# Patient Record
Sex: FEMALE | Race: WHITE | Employment: FULL TIME | URBAN - METROPOLITAN AREA
[De-identification: names, ages, dates, MRNs, and addresses within clinical notes are randomized per-mention and may not be internally consistent; named-entity substitution may affect disease eponyms.]

---

## 2024-01-10 ENCOUNTER — APPOINTMENT (OUTPATIENT)
Dept: CT IMAGING | Age: 24
End: 2024-01-10
Payer: MEDICAID

## 2024-01-10 ENCOUNTER — HOSPITAL ENCOUNTER (EMERGENCY)
Age: 24
Discharge: HOME | End: 2024-01-10
Attending: EMERGENCY MEDICINE
Payer: MEDICAID

## 2024-01-10 VITALS
SYSTOLIC BLOOD PRESSURE: 104 MMHG | DIASTOLIC BLOOD PRESSURE: 70 MMHG | RESPIRATION RATE: 16 BRPM | WEIGHT: 170 LBS | HEART RATE: 93 BPM | BODY MASS INDEX: 32.12 KG/M2 | TEMPERATURE: 98.7 F | OXYGEN SATURATION: 96 %

## 2024-01-10 DIAGNOSIS — S06.0XAS CONCUSSION WITH UNKNOWN LOSS OF CONSCIOUSNESS STATUS, SEQUELA (HCC): Primary | ICD-10-CM

## 2024-01-10 LAB
EKG ATRIAL RATE: 80 BPM
EKG DIAGNOSIS: NORMAL
EKG P AXIS: 24 DEGREES
EKG P-R INTERVAL: 138 MS
EKG Q-T INTERVAL: 380 MS
EKG QRS DURATION: 78 MS
EKG QTC CALCULATION (BAZETT): 438 MS
EKG R AXIS: 32 DEGREES
EKG T AXIS: 15 DEGREES
EKG VENTRICULAR RATE: 80 BPM
GLUCOSE BLD-MCNC: 87 MG/DL (ref 70–99)
HCG UR QL: NEGATIVE
PERFORMED ON: NORMAL

## 2024-01-10 PROCEDURE — 99284 EMERGENCY DEPT VISIT MOD MDM: CPT

## 2024-01-10 PROCEDURE — 6370000000 HC RX 637 (ALT 250 FOR IP): Performed by: INTERNAL MEDICINE

## 2024-01-10 PROCEDURE — 70450 CT HEAD/BRAIN W/O DYE: CPT

## 2024-01-10 PROCEDURE — 93005 ELECTROCARDIOGRAM TRACING: CPT | Performed by: EMERGENCY MEDICINE

## 2024-01-10 PROCEDURE — 70480 CT ORBIT/EAR/FOSSA W/O DYE: CPT

## 2024-01-10 PROCEDURE — 84703 CHORIONIC GONADOTROPIN ASSAY: CPT

## 2024-01-10 RX ORDER — ONDANSETRON 4 MG/1
4 TABLET, ORALLY DISINTEGRATING ORAL 3 TIMES DAILY PRN
Qty: 21 TABLET | Refills: 0 | Status: SHIPPED | OUTPATIENT
Start: 2024-01-10

## 2024-01-10 RX ORDER — ACETAMINOPHEN 325 MG/1
650 TABLET ORAL ONCE
Status: COMPLETED | OUTPATIENT
Start: 2024-01-10 | End: 2024-01-10

## 2024-01-10 RX ORDER — ONDANSETRON 4 MG/1
4 TABLET, ORALLY DISINTEGRATING ORAL ONCE
Status: COMPLETED | OUTPATIENT
Start: 2024-01-10 | End: 2024-01-10

## 2024-01-10 RX ADMIN — ACETAMINOPHEN 650 MG: 325 TABLET ORAL at 19:38

## 2024-01-10 RX ADMIN — ONDANSETRON 4 MG: 4 TABLET, ORALLY DISINTEGRATING ORAL at 19:38

## 2024-01-10 ASSESSMENT — ENCOUNTER SYMPTOMS
DIARRHEA: 0
CHOKING: 0
SORE THROAT: 0
SINUS PRESSURE: 0
NAUSEA: 0
ABDOMINAL PAIN: 0
EYE ITCHING: 0
SHORTNESS OF BREATH: 0
CHEST TIGHTNESS: 0
COLOR CHANGE: 0
BACK PAIN: 0
EYE DISCHARGE: 0
CONSTIPATION: 0
RHINORRHEA: 0

## 2024-01-10 ASSESSMENT — PAIN DESCRIPTION - PAIN TYPE: TYPE: ACUTE PAIN

## 2024-01-10 ASSESSMENT — PAIN DESCRIPTION - DESCRIPTORS: DESCRIPTORS: ACHING

## 2024-01-10 ASSESSMENT — PAIN - FUNCTIONAL ASSESSMENT
PAIN_FUNCTIONAL_ASSESSMENT: PREVENTS OR INTERFERES SOME ACTIVE ACTIVITIES AND ADLS
PAIN_FUNCTIONAL_ASSESSMENT: 0-10

## 2024-01-10 ASSESSMENT — PAIN SCALES - GENERAL: PAINLEVEL_OUTOF10: 3

## 2024-01-10 ASSESSMENT — PAIN DESCRIPTION - LOCATION: LOCATION: FACE

## 2024-01-10 ASSESSMENT — PAIN DESCRIPTION - ORIENTATION: ORIENTATION: RIGHT

## 2024-01-10 NOTE — ED TRIAGE NOTES
[facility] Emergency Department  MEDICAL SCREENING EXAM    Date of Service: 1/10/2024    Reason for Visit: Loss of Consciousness (Pt st tevin she donated blood one day ago and after she was finished donating she was sitting in a chair drinking juice when she had LOC. Pt states she fell forward hitting her face on a table, she has some soreness to rt side face. Pt also reports she had emesis x2 following episode. Pt c/o headache today and feeling tired.  )        Patient History, Brief Exam, and Initial Assessment     Abbreviated HPI: Lashay Alvarez is a 23 y.o. female presenting with syncope while donating blood yesterday, resulting in fall forward with face and head injury.  +LOC and vomiting x2.  +HA.  Tender below right eye.    INITIAL VITALS: BP: 104/70, Temp: 98.7 °F (37.1 °C), Pulse: 93, Respirations: 16, SpO2: 96 %    Plan     Patient was evaluated in the REU for a medical screening exam.  To further evaluate the presenting complaints, the following orders have been placed:  Orders Placed This Encounter   Procedures    CT HEAD WO CONTRAST    CT ORBITS WO CONTRAST    Pregnancy, urine    AccuCheck    POCT Glucose    EKG 12 Lead (Syncope)        See primary provider's note for full details and final disposition.     Current Facility-Administered Medications:   No orders of the defined types were placed in this encounter.        REU Dispo     Stable for lobby while awaiting ED bed        Relevant Medical History     No past medical history on file.  No past surgical history on file.  No Known Allergies

## 2024-01-11 NOTE — DISCHARGE INSTRUCTIONS
- You were seen in the emergency department due to passing out after giving blood.  Your workup was unremarkable.  CT head is reassuring.  I do suspect that you have a concussion, handout was provided.  -You are given a prescription for Zofran for nausea.  Also okay to take Tylenol for headaches.  -Follow-up with your PCP.  -Return to the ER with concerns symptoms such as fever, chills, chest pain, shortness of breath, additional syncopal episodes, vision changes or other concerning symptoms

## 2024-01-11 NOTE — ED PROVIDER NOTES
ED Attending Attestation Note     Date of evaluation: 1/10/2024    This patient was seen by the advance practice provider.  I have seen and examined the patient, agree with the workup, evaluation, management and diagnosis. The care plan has been discussed.  I have reviewed the ECG and concur with the REBECCA's interpretation.  My assessment reveals patient here with syncopal episode resulting in head injury while giving blood yesterday.  Workup here is unremarkable and patient was felt to be stable for discharge home with negative imaging, negative pregnancy, normal glucose.     Ramos Burgos MD  01/10/24 1932    
distension.      Palpations: Abdomen is soft.      Tenderness: There is no abdominal tenderness. There is no right CVA tenderness, left CVA tenderness or guarding.   Musculoskeletal:         General: Normal range of motion.      Cervical back: Normal range of motion and neck supple.   Skin:     General: Skin is warm.      Capillary Refill: Capillary refill takes less than 2 seconds.   Neurological:      General: No focal deficit present.      Mental Status: She is alert and oriented to person, place, and time.   Psychiatric:         Mood and Affect: Mood normal.         Behavior: Behavior normal.            Darryl Victor PA-C  01/10/24 1938

## 2024-10-25 ENCOUNTER — APPOINTMENT (OUTPATIENT)
Dept: CT IMAGING | Age: 24
End: 2024-10-25
Payer: COMMERCIAL

## 2024-10-25 ENCOUNTER — HOSPITAL ENCOUNTER (EMERGENCY)
Age: 24
Discharge: HOME | End: 2024-10-26
Attending: STUDENT IN AN ORGANIZED HEALTH CARE EDUCATION/TRAINING PROGRAM
Payer: COMMERCIAL

## 2024-10-25 DIAGNOSIS — N30.00 ACUTE CYSTITIS WITHOUT HEMATURIA: ICD-10-CM

## 2024-10-25 DIAGNOSIS — R10.31 RIGHT LOWER QUADRANT ABDOMINAL PAIN: Primary | ICD-10-CM

## 2024-10-25 LAB
ALBUMIN SERPL-MCNC: 4.6 G/DL (ref 3.4–5)
ALP SERPL-CCNC: 89 U/L (ref 40–129)
ALT SERPL-CCNC: 16 U/L (ref 10–40)
AMORPH SED URNS QL MICRO: ABNORMAL /HPF
ANION GAP SERPL CALCULATED.3IONS-SCNC: 11 MMOL/L (ref 3–16)
APTT BLD: 30.2 SEC (ref 22.1–36.4)
AST SERPL-CCNC: 15 U/L (ref 15–37)
BACTERIA URNS QL MICRO: ABNORMAL /HPF
BASOPHILS # BLD: 0 K/UL (ref 0–0.2)
BASOPHILS NFR BLD: 0.4 %
BILIRUB DIRECT SERPL-MCNC: 0.2 MG/DL (ref 0–0.3)
BILIRUB INDIRECT SERPL-MCNC: 0.3 MG/DL (ref 0–1)
BILIRUB SERPL-MCNC: 0.5 MG/DL (ref 0–1)
BILIRUB UR QL STRIP.AUTO: NEGATIVE
BUN SERPL-MCNC: 15 MG/DL (ref 7–20)
CALCIUM SERPL-MCNC: 9.5 MG/DL (ref 8.3–10.6)
CHLORIDE SERPL-SCNC: 104 MMOL/L (ref 99–110)
CLARITY UR: CLEAR
CO2 SERPL-SCNC: 23 MMOL/L (ref 21–32)
COLOR UR: YELLOW
CREAT SERPL-MCNC: 0.8 MG/DL (ref 0.6–1.1)
DEPRECATED RDW RBC AUTO: 12.7 % (ref 12.4–15.4)
EOSINOPHIL # BLD: 0.1 K/UL (ref 0–0.6)
EOSINOPHIL NFR BLD: 1.3 %
EPI CELLS #/AREA URNS HPF: ABNORMAL /HPF (ref 0–5)
GFR SERPLBLD CREATININE-BSD FMLA CKD-EPI: >90 ML/MIN/{1.73_M2}
GLUCOSE SERPL-MCNC: 97 MG/DL (ref 70–99)
GLUCOSE UR STRIP.AUTO-MCNC: NEGATIVE MG/DL
HCG SERPL QL: NEGATIVE
HCT VFR BLD AUTO: 42.3 % (ref 36–48)
HGB BLD-MCNC: 14.9 G/DL (ref 12–16)
HGB UR QL STRIP.AUTO: NEGATIVE
INR PPP: 1.01 (ref 0.85–1.15)
KETONES UR STRIP.AUTO-MCNC: ABNORMAL MG/DL
LEUKOCYTE ESTERASE UR QL STRIP.AUTO: ABNORMAL
LIPASE SERPL-CCNC: 24 U/L (ref 13–60)
LYMPHOCYTES # BLD: 1.2 K/UL (ref 1–5.1)
LYMPHOCYTES NFR BLD: 18.1 %
MCH RBC QN AUTO: 31.4 PG (ref 26–34)
MCHC RBC AUTO-ENTMCNC: 35.1 G/DL (ref 31–36)
MCV RBC AUTO: 89.4 FL (ref 80–100)
MONOCYTES # BLD: 0.5 K/UL (ref 0–1.3)
MONOCYTES NFR BLD: 7.5 %
MUCOUS THREADS #/AREA URNS LPF: ABNORMAL /LPF
NEUTROPHILS # BLD: 4.8 K/UL (ref 1.7–7.7)
NEUTROPHILS NFR BLD: 72.7 %
NITRITE UR QL STRIP.AUTO: POSITIVE
PH UR STRIP.AUTO: 6 [PH] (ref 5–8)
PLATELET # BLD AUTO: 325 K/UL (ref 135–450)
PMV BLD AUTO: 6.5 FL (ref 5–10.5)
POTASSIUM SERPL-SCNC: 4.4 MMOL/L (ref 3.5–5.1)
PROT SERPL-MCNC: 7.3 G/DL (ref 6.4–8.2)
PROT UR STRIP.AUTO-MCNC: ABNORMAL MG/DL
PROTHROMBIN TIME: 13.5 SEC (ref 11.9–14.9)
RBC # BLD AUTO: 4.73 M/UL (ref 4–5.2)
RBC #/AREA URNS HPF: ABNORMAL /HPF (ref 0–4)
RENAL EPI CELLS #/AREA UR COMP ASSIST: ABNORMAL /HPF (ref 0–1)
SODIUM SERPL-SCNC: 138 MMOL/L (ref 136–145)
SP GR UR STRIP.AUTO: >=1.03 (ref 1–1.03)
UA COMPLETE W REFLEX CULTURE PNL UR: YES
UA DIPSTICK W REFLEX MICRO PNL UR: YES
URN SPEC COLLECT METH UR: ABNORMAL
UROBILINOGEN UR STRIP-ACNC: 0.2 E.U./DL
WBC # BLD AUTO: 6.6 K/UL (ref 4–11)
WBC #/AREA URNS HPF: ABNORMAL /HPF (ref 0–5)

## 2024-10-25 PROCEDURE — 87086 URINE CULTURE/COLONY COUNT: CPT

## 2024-10-25 PROCEDURE — 83690 ASSAY OF LIPASE: CPT

## 2024-10-25 PROCEDURE — 81001 URINALYSIS AUTO W/SCOPE: CPT

## 2024-10-25 PROCEDURE — 6360000004 HC RX CONTRAST MEDICATION

## 2024-10-25 PROCEDURE — 87088 URINE BACTERIA CULTURE: CPT

## 2024-10-25 PROCEDURE — 80048 BASIC METABOLIC PNL TOTAL CA: CPT

## 2024-10-25 PROCEDURE — 74177 CT ABD & PELVIS W/CONTRAST: CPT

## 2024-10-25 PROCEDURE — 87186 SC STD MICRODIL/AGAR DIL: CPT

## 2024-10-25 PROCEDURE — 84703 CHORIONIC GONADOTROPIN ASSAY: CPT

## 2024-10-25 PROCEDURE — 85610 PROTHROMBIN TIME: CPT

## 2024-10-25 PROCEDURE — 85730 THROMBOPLASTIN TIME PARTIAL: CPT

## 2024-10-25 PROCEDURE — 80076 HEPATIC FUNCTION PANEL: CPT

## 2024-10-25 PROCEDURE — 99285 EMERGENCY DEPT VISIT HI MDM: CPT

## 2024-10-25 PROCEDURE — 85025 COMPLETE CBC W/AUTO DIFF WBC: CPT

## 2024-10-25 RX ORDER — IOPAMIDOL 755 MG/ML
75 INJECTION, SOLUTION INTRAVASCULAR
Status: COMPLETED | OUTPATIENT
Start: 2024-10-25 | End: 2024-10-25

## 2024-10-25 RX ADMIN — IOPAMIDOL 75 ML: 755 INJECTION, SOLUTION INTRAVENOUS at 23:12

## 2024-10-25 ASSESSMENT — PAIN SCALES - GENERAL: PAINLEVEL_OUTOF10: 8

## 2024-10-25 ASSESSMENT — PAIN DESCRIPTION - ORIENTATION: ORIENTATION: RIGHT

## 2024-10-25 ASSESSMENT — LIFESTYLE VARIABLES
HOW MANY STANDARD DRINKS CONTAINING ALCOHOL DO YOU HAVE ON A TYPICAL DAY: 7 TO 9
HOW OFTEN DO YOU HAVE A DRINK CONTAINING ALCOHOL: MONTHLY OR LESS

## 2024-10-25 ASSESSMENT — PAIN DESCRIPTION - LOCATION: LOCATION: ABDOMEN

## 2024-10-25 ASSESSMENT — PAIN - FUNCTIONAL ASSESSMENT: PAIN_FUNCTIONAL_ASSESSMENT: 0-10

## 2024-10-26 VITALS
HEART RATE: 79 BPM | TEMPERATURE: 98.3 F | DIASTOLIC BLOOD PRESSURE: 79 MMHG | BODY MASS INDEX: 35.68 KG/M2 | SYSTOLIC BLOOD PRESSURE: 119 MMHG | HEIGHT: 61 IN | OXYGEN SATURATION: 99 % | RESPIRATION RATE: 16 BRPM | WEIGHT: 189 LBS

## 2024-10-26 RX ORDER — CEPHALEXIN 500 MG/1
500 CAPSULE ORAL 4 TIMES DAILY
Qty: 28 CAPSULE | Refills: 0 | Status: SHIPPED | OUTPATIENT
Start: 2024-10-26 | End: 2024-10-26

## 2024-10-26 RX ORDER — CEPHALEXIN 500 MG/1
500 CAPSULE ORAL 4 TIMES DAILY
Qty: 28 CAPSULE | Refills: 0 | Status: SHIPPED | OUTPATIENT
Start: 2024-10-26 | End: 2024-11-02

## 2024-10-26 NOTE — ED PROVIDER NOTES
THE [facility]  EMERGENCY DEPARTMENT ENCOUNTER          EM RESIDENT NOTE       Date of evaluation: 10/25/2024    Chief Complaint     Abdominal Pain (Patient presents to ED with report of right sided ovarian pain. Reports pain started at approximately 1930 tonight, states she took 1200 mg ibuprofen at 2000 without relief. Reports a history of ovarian cysts.) and Rectal Bleeding (Patient reports rectal bleeding intermittently for 5 days.)      History of Present Illness     Lashay Macedo is a 24 y.o. female who presents with multiple complaints including abdominal pain and rectal bleeding.  She notes 2-day history of right lower quadrant pain, constant in nature without referral.  Also endorses several episodes of bright red blood per rectum with passage of some clots.  She has a history of taking 1200 to 2000 mg of ibuprofen at a time over the last week for her right sided lower abdominal pain.  She denies urinary symptoms, changes in her vaginal discharge, or concerns for STI.  Does have a history of ovarian cysts for which she has never had surgery.  No abdominal surgery history.  Last bowel movement yesterday at 12 PM.  Is passing gas    MEDICAL DECISION MAKING / ASSESSMENT / PLAN     INITIAL VITALS: BP: 121/89, Temp: 98.3 °F (36.8 °C), Pulse: 86, Respirations: 18, SpO2: 99 %    Lashay Macedo is a 24 y.o. female presenting with right lower quadrant pain and bright red blood per rectum.  Exam notable for globally well-appearing female with normal vital signs including no fever or tachycardia/hypotension.  Her abdominal exam is notable for significant right lower quadrant tenderness with pain in the right lower quadrant upon palpation of any other area the abdomen, RULA negative for any blood.  Given her level of abdominal tenderness and voluntary guarding, I elected to perform a CT of the abdomen and pelvis on this patient.  The CT did not show any acute findings including no ovarian cysts, no appendicitis,

## 2024-10-26 NOTE — ED PROVIDER NOTES
ED Attending Attestation Note     Date of evaluation: 10/25/2024    I have discussed the case with the resident. I have personally performed a history, physical exam, and my own medical decision making. I have reviewed the note and agree with the findings and plan.     INITIAL VITALS: BP: 121/89, Temp: 98.3 °F (36.8 °C), Pulse: 86, Respirations: 18, SpO2: 99 %     MDM:  My assessment reveals a well-appearing 24-year-old female presenting with abdominal pain.  On examination, her abdomen is benign.  Her workup is notable for UTI.  CT imaging was obtained and does not show any evidence of ovarian pathology.  Patient tolerating p.o. and will be discharged with a course of antibiotics.  Discharged with strict return precautions and PCP follow-up       Albaro Cordoba MD  10/26/24 0141

## 2024-10-26 NOTE — DISCHARGE INSTRUCTIONS
You were seen here in the emergency department for abdominal pain.  We performed labs and imaging that did not show any dangerous cause of abdominal pain at this time.  Your urinalysis did show a urinary tract infection which is likely the cause of your abdominal pain.  We are sending you with a course of an antibiotic called Keflex, please take the whole course and return or see your PCP if your symptoms do not improve.    Please stop taking ibuprofen, no or anything else that is in the class of NSAID.  If you have any additional rectal bleeding, dark/black stool, any lightheadedness/passing out or shortness of breath please return to the emergency department.     You should return to the emergency department if your symptoms worsen or do not resolve. In addition, return if:  - You have a fever (greater than 101 degrees)  - You have chest pain, shortness of breath, abdominal pain, or uncontrollable vomiting  - You are unable to eat or drink  - You pass out  - You have difficulty moving your arms or legs   - You have difficulty speaking or slurred speech  - Or you have any concern that you feel needs acute physician evaluation.

## 2024-10-26 NOTE — ED NOTES
Patient prepared for and ready to be discharged. Patient left at this time to home in care of self in no acute distress after verbalizing understanding of discharge instructions. Patient left after receiving After Visit Summary instructions. IV removed prior to discharge.       Nuvia Schaefer RN  10/26/24 0140

## 2024-10-27 LAB
BACTERIA UR CULT: ABNORMAL
ORGANISM: ABNORMAL

## 2024-10-28 LAB
BACTERIA UR CULT: ABNORMAL
ORGANISM: ABNORMAL

## 2024-11-03 ENCOUNTER — APPOINTMENT (OUTPATIENT)
Dept: ULTRASOUND IMAGING | Age: 24
End: 2024-11-03

## 2024-11-03 ENCOUNTER — HOSPITAL ENCOUNTER (EMERGENCY)
Age: 24
Discharge: HOME | End: 2024-11-03

## 2024-11-03 VITALS
BODY MASS INDEX: 35.45 KG/M2 | RESPIRATION RATE: 16 BRPM | HEART RATE: 68 BPM | WEIGHT: 187.6 LBS | OXYGEN SATURATION: 99 % | DIASTOLIC BLOOD PRESSURE: 96 MMHG | SYSTOLIC BLOOD PRESSURE: 136 MMHG | TEMPERATURE: 98.9 F

## 2024-11-03 DIAGNOSIS — R10.9 ABDOMINAL PAIN, UNSPECIFIED ABDOMINAL LOCATION: Primary | ICD-10-CM

## 2024-11-03 DIAGNOSIS — N30.01 ACUTE CYSTITIS WITH HEMATURIA: ICD-10-CM

## 2024-11-03 DIAGNOSIS — R10.2 PELVIC PAIN: ICD-10-CM

## 2024-11-03 DIAGNOSIS — N93.9 ABNORMAL UTERINE BLEEDING: ICD-10-CM

## 2024-11-03 LAB
ALBUMIN SERPL-MCNC: 4.7 G/DL (ref 3.4–5)
ALBUMIN/GLOB SERPL: 1.5 {RATIO} (ref 1.1–2.2)
ALP SERPL-CCNC: 92 U/L (ref 40–129)
ALT SERPL-CCNC: 19 U/L (ref 10–40)
ANION GAP SERPL CALCULATED.3IONS-SCNC: 14 MMOL/L (ref 3–16)
AST SERPL-CCNC: 17 U/L (ref 15–37)
B-HCG SERPL EIA 3RD IS-ACNC: <5 MIU/ML
BACTERIA GENITAL QL WET PREP: NORMAL
BACTERIA URNS QL MICRO: ABNORMAL /HPF
BASOPHILS # BLD: 0 K/UL (ref 0–0.2)
BASOPHILS NFR BLD: 0.3 %
BILIRUB SERPL-MCNC: 0.6 MG/DL (ref 0–1)
BILIRUB UR QL STRIP.AUTO: NEGATIVE
BUN SERPL-MCNC: 16 MG/DL (ref 7–20)
CALCIUM SERPL-MCNC: 9.8 MG/DL (ref 8.3–10.6)
CHLORIDE SERPL-SCNC: 102 MMOL/L (ref 99–110)
CLARITY UR: CLEAR
CLUE CELLS SPEC QL WET PREP: NORMAL
CO2 SERPL-SCNC: 20 MMOL/L (ref 21–32)
COLOR UR: YELLOW
CREAT SERPL-MCNC: 1 MG/DL (ref 0.6–1.1)
CRYSTALS URNS MICRO: ABNORMAL /HPF
DEPRECATED RDW RBC AUTO: 12.9 % (ref 12.4–15.4)
EOSINOPHIL # BLD: 0 K/UL (ref 0–0.6)
EOSINOPHIL NFR BLD: 0.4 %
EPI CELLS #/AREA URNS HPF: ABNORMAL /HPF (ref 0–5)
EPI CELLS SPEC QL WET PREP: NORMAL
GFR SERPLBLD CREATININE-BSD FMLA CKD-EPI: 81 ML/MIN/{1.73_M2}
GLUCOSE SERPL-MCNC: 125 MG/DL (ref 70–99)
GLUCOSE UR STRIP.AUTO-MCNC: NEGATIVE MG/DL
HCG UR QL: NEGATIVE
HCT VFR BLD AUTO: 44.2 % (ref 36–48)
HGB BLD-MCNC: 15.3 G/DL (ref 12–16)
HGB UR QL STRIP.AUTO: ABNORMAL
KETONES UR STRIP.AUTO-MCNC: NEGATIVE MG/DL
LEUKOCYTE ESTERASE UR QL STRIP.AUTO: NEGATIVE
LIPASE SERPL-CCNC: 30 U/L (ref 13–60)
LYMPHOCYTES # BLD: 0.9 K/UL (ref 1–5.1)
LYMPHOCYTES NFR BLD: 8 %
MCH RBC QN AUTO: 30.9 PG (ref 26–34)
MCHC RBC AUTO-ENTMCNC: 34.6 G/DL (ref 31–36)
MCV RBC AUTO: 89.3 FL (ref 80–100)
MONOCYTES # BLD: 0.5 K/UL (ref 0–1.3)
MONOCYTES NFR BLD: 4.2 %
NEUTROPHILS # BLD: 9.6 K/UL (ref 1.7–7.7)
NEUTROPHILS NFR BLD: 87.1 %
NITRITE UR QL STRIP.AUTO: NEGATIVE
PH UR STRIP.AUTO: 6.5 [PH] (ref 5–8)
PLATELET # BLD AUTO: 374 K/UL (ref 135–450)
PMV BLD AUTO: 7.1 FL (ref 5–10.5)
POTASSIUM SERPL-SCNC: 3.9 MMOL/L (ref 3.5–5.1)
PROT SERPL-MCNC: 7.8 G/DL (ref 6.4–8.2)
PROT UR STRIP.AUTO-MCNC: 100 MG/DL
RBC # BLD AUTO: 4.95 M/UL (ref 4–5.2)
RBC #/AREA URNS HPF: ABNORMAL /HPF (ref 0–4)
RBC SPEC QL WET PREP: NORMAL
RENAL EPI CELLS #/AREA UR COMP ASSIST: ABNORMAL /HPF (ref 0–1)
SODIUM SERPL-SCNC: 136 MMOL/L (ref 136–145)
SP GR UR STRIP.AUTO: 1.02 (ref 1–1.03)
SPECIMEN SOURCE FLD: NORMAL
T VAGINALIS GENITAL QL WET PREP: NORMAL
UA DIPSTICK W REFLEX MICRO PNL UR: YES
URN SPEC COLLECT METH UR: ABNORMAL
UROBILINOGEN UR STRIP-ACNC: 0.2 E.U./DL
WBC # BLD AUTO: 11 K/UL (ref 4–11)
WBC #/AREA URNS HPF: ABNORMAL /HPF (ref 0–5)
WBC SPEC QL WET PREP: NORMAL
YEAST GENITAL QL WET PREP: NORMAL

## 2024-11-03 PROCEDURE — 84703 CHORIONIC GONADOTROPIN ASSAY: CPT

## 2024-11-03 PROCEDURE — 85025 COMPLETE CBC W/AUTO DIFF WBC: CPT

## 2024-11-03 PROCEDURE — 87210 SMEAR WET MOUNT SALINE/INK: CPT

## 2024-11-03 PROCEDURE — 84702 CHORIONIC GONADOTROPIN TEST: CPT

## 2024-11-03 PROCEDURE — 96374 THER/PROPH/DIAG INJ IV PUSH: CPT

## 2024-11-03 PROCEDURE — 76830 TRANSVAGINAL US NON-OB: CPT

## 2024-11-03 PROCEDURE — 99284 EMERGENCY DEPT VISIT MOD MDM: CPT

## 2024-11-03 PROCEDURE — 83690 ASSAY OF LIPASE: CPT

## 2024-11-03 PROCEDURE — 6370000000 HC RX 637 (ALT 250 FOR IP)

## 2024-11-03 PROCEDURE — 87591 N.GONORRHOEAE DNA AMP PROB: CPT

## 2024-11-03 PROCEDURE — 80053 COMPREHEN METABOLIC PANEL: CPT

## 2024-11-03 PROCEDURE — 76856 US EXAM PELVIC COMPLETE: CPT

## 2024-11-03 PROCEDURE — 6360000002 HC RX W HCPCS

## 2024-11-03 PROCEDURE — 87491 CHLMYD TRACH DNA AMP PROBE: CPT

## 2024-11-03 PROCEDURE — 81001 URINALYSIS AUTO W/SCOPE: CPT

## 2024-11-03 RX ORDER — ONDANSETRON 2 MG/ML
4 INJECTION INTRAMUSCULAR; INTRAVENOUS ONCE
Status: COMPLETED | OUTPATIENT
Start: 2024-11-03 | End: 2024-11-03

## 2024-11-03 RX ORDER — ACETAMINOPHEN 500 MG
1000 TABLET ORAL ONCE
Status: COMPLETED | OUTPATIENT
Start: 2024-11-03 | End: 2024-11-03

## 2024-11-03 RX ORDER — CEFDINIR 300 MG/1
300 CAPSULE ORAL 2 TIMES DAILY
Qty: 20 CAPSULE | Refills: 0 | Status: SHIPPED | OUTPATIENT
Start: 2024-11-03 | End: 2024-11-13

## 2024-11-03 RX ORDER — CEFDINIR 300 MG/1
300 CAPSULE ORAL ONCE
Status: COMPLETED | OUTPATIENT
Start: 2024-11-03 | End: 2024-11-03

## 2024-11-03 RX ADMIN — CEFDINIR 300 MG: 300 CAPSULE ORAL at 18:26

## 2024-11-03 RX ADMIN — ONDANSETRON 4 MG: 2 INJECTION INTRAMUSCULAR; INTRAVENOUS at 16:13

## 2024-11-03 RX ADMIN — ACETAMINOPHEN 1000 MG: 500 TABLET ORAL at 16:13

## 2024-11-03 ASSESSMENT — PAIN DESCRIPTION - LOCATION: LOCATION: ABDOMEN

## 2024-11-03 ASSESSMENT — PAIN - FUNCTIONAL ASSESSMENT
PAIN_FUNCTIONAL_ASSESSMENT: PREVENTS OR INTERFERES WITH ALL ACTIVE AND SOME PASSIVE ACTIVITIES
PAIN_FUNCTIONAL_ASSESSMENT: NONE - DENIES PAIN
PAIN_FUNCTIONAL_ASSESSMENT: 0-10

## 2024-11-03 ASSESSMENT — PAIN DESCRIPTION - ORIENTATION: ORIENTATION: LEFT;LOWER

## 2024-11-03 ASSESSMENT — PAIN DESCRIPTION - FREQUENCY: FREQUENCY: CONTINUOUS

## 2024-11-03 ASSESSMENT — PAIN DESCRIPTION - DESCRIPTORS: DESCRIPTORS: SHARP

## 2024-11-03 ASSESSMENT — PAIN SCALES - GENERAL: PAINLEVEL_OUTOF10: 10

## 2024-11-03 ASSESSMENT — PAIN DESCRIPTION - PAIN TYPE: TYPE: ACUTE PAIN

## 2024-11-03 ASSESSMENT — PAIN DESCRIPTION - ONSET: ONSET: ON-GOING

## 2024-11-03 NOTE — ED PROVIDER NOTES
Extraocular movements intact.      Pupils: Pupils are equal, round, and reactive to light.   Cardiovascular:      Rate and Rhythm: Normal rate and regular rhythm.      Heart sounds: No murmur heard.  Pulmonary:      Effort: Pulmonary effort is normal. No respiratory distress.      Breath sounds: Normal breath sounds.   Abdominal:      Comments: There is discomfort to palpation in the left lower quadrant, otherwise nontender no rebound or guarding anywhere   Genitourinary:     Comments: With female RN present pelvic exam was performed.  There was a scant amount of bright red blood in the vaginal vault, there was a small amount of blood seen within the cervical os.  There did not appear to be any masses or signs of friability.  There was no purulent discharge.  Bimanual exam was unremarkable no masses palpated no cervical motion tenderness.  Neurological:      General: No focal deficit present.      Mental Status: She is alert and oriented to person, place, and time.                   Jose Price MD  11/03/24 [id number]

## 2024-11-03 NOTE — ED NOTES
Patient discharged to home via family.  Written discharge instructions reviewed with understanding.  Copy of AVS sent home with patient. Patient able to walk from ED without assistance.       Kym Pal RN  11/03/24 0012

## 2024-11-03 NOTE — DISCHARGE INSTRUCTIONS
You are seen in the emergency department for abdominal pain.  He had resolution of your symptoms.  You had a negative pelvic ultrasound and negative blood work.  Is possible this could be due to your urinary tract infection.  We will start you on a different antibiotic take as directed.  Follow-up with your PCP, OB/GYN, GI doctor.  Call for appointments first thing in the morning.  Return to the emergency department for any new or worsening symptoms, nausea, vomiting, inability to eat or drink or other concerns.

## 2024-11-04 LAB
C TRACH DNA CVX QL NAA+PROBE: NEGATIVE
N GONORRHOEA DNA CERV MUCUS QL NAA+PROBE: NEGATIVE